# Patient Record
Sex: FEMALE | NOT HISPANIC OR LATINO | URBAN - NONMETROPOLITAN AREA
[De-identification: names, ages, dates, MRNs, and addresses within clinical notes are randomized per-mention and may not be internally consistent; named-entity substitution may affect disease eponyms.]

---

## 2022-03-15 ENCOUNTER — OFFICE VISIT (OUTPATIENT)
Dept: URBAN - NONMETROPOLITAN AREA CLINIC 18 | Facility: CLINIC | Age: 32
End: 2022-03-15
Payer: COMMERCIAL

## 2022-03-15 DIAGNOSIS — H43.393 OTHER VITREOUS OPACITIES, BILATERAL: ICD-10-CM

## 2022-03-15 DIAGNOSIS — H52.13 MYOPIA, BILATERAL: Primary | ICD-10-CM

## 2022-03-15 DIAGNOSIS — R51.9 HEADACHE, UNSPECIFIED: ICD-10-CM

## 2022-03-15 PROCEDURE — 92004 COMPRE OPH EXAM NEW PT 1/>: CPT | Performed by: OPTOMETRIST

## 2022-03-15 ASSESSMENT — VISUAL ACUITY
OD: 20/20
OS: 20/20

## 2022-03-15 ASSESSMENT — INTRAOCULAR PRESSURE
OS: 16
OD: 16

## 2022-03-15 NOTE — IMPRESSION/PLAN
Impression: Myopia, bilateral: H52.13. Plan: Reviewed refractive prescription in detail with patient and ways glasses can be used  to improve vision. Release spectacle prescription at this time. Reviewed risk associated with not dilating pupils. Patient understands risk and declines diagnostic agents.  Patient knows to RTC immediately if flashes, floaters or changes in vision are experienced